# Patient Record
(demographics unavailable — no encounter records)

---

## 2025-05-19 NOTE — END OF VISIT
[TextEntry] : INo, am scribing for and the presence of Dr. Aaliyah Quiles the following sections HISTORY OF PRESENT ILLNESS, PAST MEDICAL/FAMILY/SOCIAL HISTORY; REVIEW OF SYSTEMS; PHYSICAL EXAM; DISPOSITION.